# Patient Record
Sex: FEMALE | Race: WHITE
[De-identification: names, ages, dates, MRNs, and addresses within clinical notes are randomized per-mention and may not be internally consistent; named-entity substitution may affect disease eponyms.]

---

## 2018-05-13 ENCOUNTER — HOSPITAL ENCOUNTER (EMERGENCY)
Dept: HOSPITAL 17 - NEPC | Age: 83
Discharge: NURSING, REHAB OR LONG-TERM CARE | End: 2018-05-13
Payer: COMMERCIAL

## 2018-05-13 VITALS
HEART RATE: 88 BPM | DIASTOLIC BLOOD PRESSURE: 70 MMHG | SYSTOLIC BLOOD PRESSURE: 124 MMHG | OXYGEN SATURATION: 96 % | RESPIRATION RATE: 20 BRPM

## 2018-05-13 VITALS
RESPIRATION RATE: 20 BRPM | SYSTOLIC BLOOD PRESSURE: 176 MMHG | DIASTOLIC BLOOD PRESSURE: 90 MMHG | TEMPERATURE: 98.7 F | OXYGEN SATURATION: 98 % | HEART RATE: 103 BPM

## 2018-05-13 VITALS — SYSTOLIC BLOOD PRESSURE: 120 MMHG | DIASTOLIC BLOOD PRESSURE: 57 MMHG

## 2018-05-13 DIAGNOSIS — B96.20: ICD-10-CM

## 2018-05-13 DIAGNOSIS — N39.0: ICD-10-CM

## 2018-05-13 DIAGNOSIS — F41.8: ICD-10-CM

## 2018-05-13 DIAGNOSIS — H91.93: ICD-10-CM

## 2018-05-13 DIAGNOSIS — G20: ICD-10-CM
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
## 2018-05-13 DIAGNOSIS — M81.0: ICD-10-CM

## 2018-05-13 DIAGNOSIS — I10: ICD-10-CM

## 2018-05-13 DIAGNOSIS — S00.83XA: Primary | ICD-10-CM

## 2018-05-13 DIAGNOSIS — F31.9: ICD-10-CM

## 2018-05-13 DIAGNOSIS — B95.4: ICD-10-CM

## 2018-05-13 DIAGNOSIS — F03.90: ICD-10-CM
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
## 2018-05-13 LAB
ALBUMIN SERPL-MCNC: 3.3 GM/DL (ref 3.4–5)
ALP SERPL-CCNC: 153 U/L (ref 45–117)
ALT SERPL-CCNC: 30 U/L (ref 10–53)
AST SERPL-CCNC: 28 U/L (ref 15–37)
BACTERIA #/AREA URNS HPF: (no result) /HPF
BASOPHILS # BLD AUTO: 0 TH/MM3 (ref 0–0.2)
BASOPHILS NFR BLD: 0.2 % (ref 0–2)
BILIRUB SERPL-MCNC: 1.2 MG/DL (ref 0.2–1)
BUN SERPL-MCNC: 11 MG/DL (ref 7–18)
CALCIUM SERPL-MCNC: 9 MG/DL (ref 8.5–10.1)
CHLORIDE SERPL-SCNC: 101 MEQ/L (ref 98–107)
COLOR UR: YELLOW
CREAT SERPL-MCNC: 0.53 MG/DL (ref 0.5–1)
EOSINOPHIL # BLD: 0 TH/MM3 (ref 0–0.4)
EOSINOPHIL NFR BLD: 0.4 % (ref 0–4)
ERYTHROCYTE [DISTWIDTH] IN BLOOD BY AUTOMATED COUNT: 15.3 % (ref 11.6–17.2)
GFR SERPLBLD BASED ON 1.73 SQ M-ARVRAT: 109 ML/MIN (ref 89–?)
GLUCOSE SERPL-MCNC: 82 MG/DL (ref 74–106)
GLUCOSE UR STRIP-MCNC: (no result) MG/DL
HCO3 BLD-SCNC: 32.1 MEQ/L (ref 21–32)
HCT VFR BLD CALC: 44.2 % (ref 35–46)
HGB BLD-MCNC: 14.8 GM/DL (ref 11.6–15.3)
HGB UR QL STRIP: (no result)
HYALINE CASTS #/AREA URNS LPF: 2 /LPF
KETONES UR STRIP-MCNC: (no result) MG/DL
LYMPHOCYTES # BLD AUTO: 2.1 TH/MM3 (ref 1–4.8)
LYMPHOCYTES NFR BLD AUTO: 17.9 % (ref 9–44)
MCH RBC QN AUTO: 30.3 PG (ref 27–34)
MCHC RBC AUTO-ENTMCNC: 33.5 % (ref 32–36)
MCV RBC AUTO: 90.4 FL (ref 80–100)
MONOCYTE #: 0.7 TH/MM3 (ref 0–0.9)
MONOCYTES NFR BLD: 6.4 % (ref 0–8)
MUCOUS THREADS #/AREA URNS LPF: (no result) /LPF
NEUTROPHILS # BLD AUTO: 8.6 TH/MM3 (ref 1.8–7.7)
NEUTROPHILS NFR BLD AUTO: 75.1 % (ref 16–70)
NITRITE UR QL STRIP: (no result)
PLATELET # BLD: 218 TH/MM3 (ref 150–450)
PMV BLD AUTO: 8.6 FL (ref 7–11)
PROT SERPL-MCNC: 8.3 GM/DL (ref 6.4–8.2)
RBC # BLD AUTO: 4.88 MIL/MM3 (ref 4–5.3)
SODIUM SERPL-SCNC: 141 MEQ/L (ref 136–145)
SP GR UR STRIP: 1.02 (ref 1–1.03)
SQUAMOUS #/AREA URNS HPF: 1 /HPF (ref 0–5)
URINE LEUKOCYTE ESTERASE: (no result)
WBC # BLD AUTO: 11.5 TH/MM3 (ref 4–11)

## 2018-05-13 PROCEDURE — 87077 CULTURE AEROBIC IDENTIFY: CPT

## 2018-05-13 PROCEDURE — 87186 SC STD MICRODIL/AGAR DIL: CPT

## 2018-05-13 PROCEDURE — 70450 CT HEAD/BRAIN W/O DYE: CPT

## 2018-05-13 PROCEDURE — 99284 EMERGENCY DEPT VISIT MOD MDM: CPT

## 2018-05-13 PROCEDURE — 87086 URINE CULTURE/COLONY COUNT: CPT

## 2018-05-13 PROCEDURE — 85025 COMPLETE CBC W/AUTO DIFF WBC: CPT

## 2018-05-13 PROCEDURE — 80053 COMPREHEN METABOLIC PANEL: CPT

## 2018-05-13 PROCEDURE — 81001 URINALYSIS AUTO W/SCOPE: CPT

## 2018-05-13 NOTE — PD
HPI


Chief Complaint:  Fall


Time Seen by Provider:  04:56


Travel History


International Travel<30 days:  No


Contact w/Intl Traveler<30days:  No


Traveled to known affect area:  No





History of Present Illness


HPI


88-year-old female patient presents to the ER today from nursing home, she is 

demented, and had fallen out of bed this evening, hitting her head.  She did 

not have any apparent loss of consciousness but she is not a reliable 

historian.  She does not appear to have any other injuries.





Modifying Factors: None


Associated Signs & Symptoms: Fall from bed, head injury


Risk Factors: Elderly, dementia





PFSH


Past Medical History


Bipolar Disorder:  Yes


Anxiety:  Yes


Depression:  Yes


Diminished Hearing:  Yes (bilat hearing aids)


Genitourinary:  Yes (urinary retention)


Hypertension:  Yes


Musculoskeletal:  Yes (osteoporosis)


Neurologic:  Yes (parkinsons)


Parkinson's Disease:  Yes





Past Surgical History


Body Medical Devices:  unable to assess





Social History


Alcohol Use:  No


Tobacco Use:  No





Allergies-Medications


(Allergen,Severity, Reaction):  


Coded Allergies:  


     aspirin (Unverified  Allergy, Unknown, 8/15/17)


     codeine (Unverified  Allergy, Unknown, 8/15/17)


     metronidazole (Unverified  Allergy, Unknown, 8/15/17)


     penicillin G (Unverified  Allergy, Unknown, 8/15/17)


Reported Meds & Prescriptions





Reported Meds & Active Scripts


Active


Furosemide Oral Solution (Furosemide) 8 Mg/Ml Sol 40 Mg PO BID@09,18 30 Days


Betimol 0.5% Opht Soln (Timolol Maleate) 100 Drop/5 Ml Soln 1 Drop RIGHT EYE 

BID 30 Days


Mysoline  50 Mg Tab (Primidone) 50 Mg Tab 50 Mg PO BID 30 Days


Kcl   20 Meq Oral Soln (Potassium Chloride) 20 Meq/15 Ml Liqd 20 Meq PO Q12HR 

30 Days


Protonix (Pantoprazole Sodium) 20 Mg Tab 20 Mg PO DAILY 30 Days


Theragran (Multivitamins) 1 Tab Tab 1 Tab PO DAILY 30 Days


Sinemet 25/100 (Carbidopa/Levodopa) 1 Tab Tab 1 Tab PO TID 30 Days


Haldol (Haloperidol) 5 Mg Tab 7.5 Mg PO TID 30 Days


Haldol (Haloperidol Lactate) 5 Mg/Ml Inj 7.5 Mg IM TID PRN 30 Days


Haldol (Decanoate) 50 mg Ampule (Haloperidol Decanoate) 50 Mg/Ml  Inj 50 Mg IM 

Q28D 0 Days


     Next dose of Haldol Decanoate due on 7/22/2016.  Re-evaluate need for


     oral Haldol supplementation when next dose of Haldol Dec is


     administered.


Trusopt 2% Opht Soln (Dorzolamide HCl) 200 Drop/10 Ml Soln 1 Drop RIGHT EYE BID 

30 Days


Colace 100 Mg Cap (Docusate Sodium) 100 Mg Cap 100 Mg PO BID 30 Days


Catapres-Tts-1 (Clonidine Hcl) 0.1 Mg/24 Hr Dis 1 Patch TD Q7D 30 Days


Alphagan P (Brimonidine Tartrate) 5 Ml Btl 1 Drop EACH EYE BID 30 Days


Lipitor 10 mg tab (Atorvastatin) 10 Mg Tab 10 Mg PO DAILY 30 Days








Review of Systems


ROS Limitations:  Altered Mental Status (Poor historian, not reliable)





Physical Exam


Narrative


GENERAL: Well-developed elderly white female patient currently and no acute 

distress.  Awake and alert.  Not oriented.


SKIN: Focused skin assessment warm/dry.


HEAD: Ecchymosis notable over the left forehead.  Normocephalic. 


EYES: Pupils equal and round. No scleral icterus. No injection or drainage. 


ENT: No nasal bleeding or discharge.  Mucous membranes pink and moist.


NECK: Trachea midline. No JVD. 


CARDIOVASCULAR: Regular rate and rhythm.  No murmur appreciated.


RESPIRATORY: No accessory muscle use. Clear to auscultation. Breath sounds 

equal bilaterally. 


GASTROINTESTINAL: Abdomen soft, non-tender, nondistended. Hepatic and splenic 

margins not palpable. 


MUSCULOSKELETAL: No obvious deformities. No clubbing.  No cyanosis.  No edema. 


NEUROLOGICAL: Awake and alert. No obvious cranial nerve deficits.  Motor 

grossly within normal limits. Normal speech.


PSYCHIATRIC: Appropriate mood and affect; insight and judgment poor.





Data


Data


Last Documented VS





Vital Signs








  Date Time  Temp Pulse Resp B/P (MAP) Pulse Ox O2 Delivery O2 Flow Rate FiO2


 


5/13/18 05:02      Room Air  


 


5/13/18 04:53 98.7 103 20 176/90 (118) 98   








Orders





 Orders


Complete Blood Count With Diff (5/13/18 04:56)


Comprehensive Metabolic Panel (5/13/18 04:56)


Urinalysis - C+S If Indicated (5/13/18 04:56)


Ct Brain W/O Iv Contrast(Rout) (5/13/18 04:56)


Blood Glucose (5/13/18 04:56)


Ecg Monitoring (5/13/18 04:56)


Iv Access Insert/Monitor (5/13/18 04:56)


Oximetry (5/13/18 04:56)


Sodium Chloride 0.9% Flush (Ns Flush) (5/13/18 05:00)


Urine Culture (5/13/18 05:11)


Nitrofurantoin Monohyd Macrocr (Macrobid (5/13/18 06:00)





Labs





Laboratory Tests








Test


  5/13/18


05:00 5/13/18


05:11


 


White Blood Count 11.5 TH/MM3  


 


Red Blood Count 4.88 MIL/MM3  


 


Hemoglobin 14.8 GM/DL  


 


Hematocrit 44.2 %  


 


Mean Corpuscular Volume 90.4 FL  


 


Mean Corpuscular Hemoglobin 30.3 PG  


 


Mean Corpuscular Hemoglobin


Concent 33.5 % 


  


 


 


Red Cell Distribution Width 15.3 %  


 


Platelet Count 218 TH/MM3  


 


Mean Platelet Volume 8.6 FL  


 


Neutrophils (%) (Auto) 75.1 %  


 


Lymphocytes (%) (Auto) 17.9 %  


 


Monocytes (%) (Auto) 6.4 %  


 


Eosinophils (%) (Auto) 0.4 %  


 


Basophils (%) (Auto) 0.2 %  


 


Neutrophils # (Auto) 8.6 TH/MM3  


 


Lymphocytes # (Auto) 2.1 TH/MM3  


 


Monocytes # (Auto) 0.7 TH/MM3  


 


Eosinophils # (Auto) 0.0 TH/MM3  


 


Basophils # (Auto) 0.0 TH/MM3  


 


CBC Comment DIFF FINAL  


 


Differential Comment   


 


Blood Urea Nitrogen 11 MG/DL  


 


Creatinine 0.53 MG/DL  


 


Random Glucose 82 MG/DL  


 


Total Protein 8.3 GM/DL  


 


Albumin 3.3 GM/DL  


 


Calcium Level 9.0 MG/DL  


 


Alkaline Phosphatase 153 U/L  


 


Aspartate Amino Transf


(AST/SGOT) 28 U/L 


  


 


 


Alanine Aminotransferase


(ALT/SGPT) 30 U/L 


  


 


 


Total Bilirubin 1.2 MG/DL  


 


Sodium Level 141 MEQ/L  


 


Potassium Level 4.2 MEQ/L  


 


Chloride Level 101 MEQ/L  


 


Carbon Dioxide Level 32.1 MEQ/L  


 


Anion Gap 8 MEQ/L  


 


Estimat Glomerular Filtration


Rate 109 ML/MIN 


  


 


 


Urine Color  YELLOW 


 


Urine Turbidity  HAZY 


 


Urine pH  5.5 


 


Urine Specific Gravity  1.021 


 


Urine Protein  30 mg/dL 


 


Urine Glucose (UA)  NEG mg/dL 


 


Urine Ketones  TRACE mg/dL 


 


Urine Occult Blood  NEG 


 


Urine Nitrite  POS 


 


Urine Bilirubin  NEG 


 


Urine Urobilinogen  2.0 MG/DL 


 


Urine Leukocyte Esterase  MOD 


 


Urine RBC  1 /hpf 


 


Urine WBC  12 /hpf 


 


Urine Squamous Epithelial


Cells 


  1 /hpf 


 


 


Urine Bacteria  RARE /hpf 


 


Urine Hyaline Casts  2 /lpf 


 


Urine Mucus  FEW /lpf 


 


Microscopic Urinalysis Comment


  


  CATH-CULTURE


IND











MDM


Medical Decision Making


Medical Screen Exam Complete:  Yes


Emergency Medical Condition:  Yes


Medical Record Reviewed:  Yes


Interpretation(s)





Laboratory Tests








Test


  5/13/18


05:00 5/13/18


05:11


 


White Blood Count


  11.5 TH/MM3


(4.0-11.0) 


 


 


Neutrophils (%) (Auto)


  75.1 %


(16.0-70.0) 


 


 


Neutrophils # (Auto)


  8.6 TH/MM3


(1.8-7.7) 


 


 


Total Protein


  8.3 GM/DL


(6.4-8.2) 


 


 


Albumin


  3.3 GM/DL


(3.4-5.0) 


 


 


Alkaline Phosphatase


  153 U/L


() 


 


 


Total Bilirubin


  1.2 MG/DL


(0.2-1.0) 


 


 


Carbon Dioxide Level


  32.1 MEQ/L


(21.0-32.0) 


 


 


Urine Turbidity  HAZY (CLEAR) 


 


Urine Protein


  


  30 mg/dL


(NEG-TRACE)


 


Urine Ketones


  


  TRACE mg/dL


(NEG)


 


Urine Nitrite  POS (NEG) 


 


Urine Leukocyte Esterase  MOD (NEG) 


 


Urine WBC  12 /hpf (0-5) 


 


Urine Bacteria


  


  RARE /hpf


(NONE)


 


Urine Mucus  FEW /lpf (OCC) 








Differential Diagnosis


Fall out of bed, head injury: Rule out intracranial injuries


Narrative Course


CT the brain did not show any signs of acute intracranial injuries.  She does 

have a UTI but it is unclear whether this has contributed to her fall.  Patient 

apparently had been given Ativan today which could have increase her 

disorientation and unsteadiness and may have contributed.  At this point, my 

plan would be to treat her UTI and have her follow-up with primary care doctor.

  Return for any worsening in symptoms as needed.





Diagnosis





 Primary Impression:  


 Fall


 Additional Impressions:  


 Forehead contusion


 UTI (urinary tract infection)


***Med/Other Pt SpecificInfo:  Prescription(s) given


Scripts


Nitrofurantoin Monohydrate Macrocrystals (Macrobid) 100 Mg Cap


100 MG PO BID for Infection for 7 Days, #14 CAP 0 Refills


   Prov: Joseluis Gil MD         5/13/18


Disposition:  03 DISCHARGE TO SNF


Condition:  Stable











SoonJoseluis nam MD May 13, 2018 05:00

## 2018-05-13 NOTE — RADRPT
EXAM DATE/TIME:  05/13/2018 05:31 

 

[facility] COMPARISON:     

CT BRAIN W/O CONTRAST, May 13, 2016, 16:16.

 

 

INDICATIONS :     

Trauma. Fall.

                      

 

RADIATION DOSE:     

66.34 CTDIvol (mGy) 

 

 

 

MEDICAL HISTORY :     

Dementia. Parkinsons. Hypertension.

 

SURGICAL HISTORY :      

None. 

 

ENCOUNTER:      

Initial

 

ACUITY:      

2 days

 

PAIN SCALE:      

Non-responsive

 

LOCATION:        

cranial 

 

TECHNIQUE:     

Multiple contiguous axial images were obtained of the head.  Using automated exposure control and adj
ustment of the mA and/or kV according to patient size, radiation dose was kept as low as reasonably a
chievable to obtain optimal diagnostic quality images.   DICOM format image data is available electro
nically for review and comparison.  

 

FINDINGS:     

 

CEREBRUM:     

The ventricles are normal for age.  No evidence of midline shift, mass lesion, hemorrhage or acute in
farction.  No extra-axial fluid collections are seen.

 

POSTERIOR FOSSA:     

The cerebellum and brainstem are intact.  The 4th ventricle is midline.  The cerebellopontine angle i
s unremarkable.

 

EXTRACRANIAL:     

The visualized portion of the orbits is intact.

 

SKULL:     

The calvaria is intact.  No evidence of skull fracture.

 

CONCLUSION:     

Stable negative noncontrast head CT

 

 

 

 Julian Bowen MD on May 13, 2018 at 5:54           

Board Certified Radiologist.

 This report was verified electronically.